# Patient Record
Sex: FEMALE | Race: WHITE | NOT HISPANIC OR LATINO | URBAN - METROPOLITAN AREA
[De-identification: names, ages, dates, MRNs, and addresses within clinical notes are randomized per-mention and may not be internally consistent; named-entity substitution may affect disease eponyms.]

---

## 2017-08-30 ENCOUNTER — IMPORTED ENCOUNTER (OUTPATIENT)
Dept: URBAN - METROPOLITAN AREA CLINIC 50 | Facility: CLINIC | Age: 72
End: 2017-08-30

## 2017-10-04 ENCOUNTER — IMPORTED ENCOUNTER (OUTPATIENT)
Dept: URBAN - METROPOLITAN AREA CLINIC 50 | Facility: CLINIC | Age: 72
End: 2017-10-04

## 2018-04-04 ENCOUNTER — IMPORTED ENCOUNTER (OUTPATIENT)
Dept: URBAN - METROPOLITAN AREA CLINIC 50 | Facility: CLINIC | Age: 73
End: 2018-04-04

## 2018-06-15 ENCOUNTER — IMPORTED ENCOUNTER (OUTPATIENT)
Dept: URBAN - METROPOLITAN AREA CLINIC 50 | Facility: CLINIC | Age: 73
End: 2018-06-15

## 2018-06-18 ENCOUNTER — IMPORTED ENCOUNTER (OUTPATIENT)
Dept: URBAN - METROPOLITAN AREA CLINIC 50 | Facility: CLINIC | Age: 73
End: 2018-06-18

## 2018-06-27 ENCOUNTER — IMPORTED ENCOUNTER (OUTPATIENT)
Dept: URBAN - METROPOLITAN AREA CLINIC 50 | Facility: CLINIC | Age: 73
End: 2018-06-27

## 2018-07-06 ENCOUNTER — IMPORTED ENCOUNTER (OUTPATIENT)
Dept: URBAN - METROPOLITAN AREA CLINIC 50 | Facility: CLINIC | Age: 73
End: 2018-07-06

## 2018-07-13 ENCOUNTER — IMPORTED ENCOUNTER (OUTPATIENT)
Dept: URBAN - METROPOLITAN AREA CLINIC 50 | Facility: CLINIC | Age: 73
End: 2018-07-13

## 2018-10-03 ENCOUNTER — IMPORTED ENCOUNTER (OUTPATIENT)
Dept: URBAN - METROPOLITAN AREA CLINIC 50 | Facility: CLINIC | Age: 73
End: 2018-10-03

## 2018-10-08 ENCOUNTER — IMPORTED ENCOUNTER (OUTPATIENT)
Dept: URBAN - METROPOLITAN AREA CLINIC 50 | Facility: CLINIC | Age: 73
End: 2018-10-08

## 2019-04-03 ENCOUNTER — IMPORTED ENCOUNTER (OUTPATIENT)
Dept: URBAN - METROPOLITAN AREA CLINIC 50 | Facility: CLINIC | Age: 74
End: 2019-04-03

## 2019-04-05 ENCOUNTER — IMPORTED ENCOUNTER (OUTPATIENT)
Dept: URBAN - METROPOLITAN AREA CLINIC 50 | Facility: CLINIC | Age: 74
End: 2019-04-05

## 2019-06-14 ENCOUNTER — IMPORTED ENCOUNTER (OUTPATIENT)
Dept: URBAN - METROPOLITAN AREA CLINIC 50 | Facility: CLINIC | Age: 74
End: 2019-06-14

## 2019-06-17 ENCOUNTER — IMPORTED ENCOUNTER (OUTPATIENT)
Dept: URBAN - METROPOLITAN AREA CLINIC 50 | Facility: CLINIC | Age: 74
End: 2019-06-17

## 2019-08-01 ENCOUNTER — IMPORTED ENCOUNTER (OUTPATIENT)
Dept: URBAN - METROPOLITAN AREA CLINIC 50 | Facility: CLINIC | Age: 74
End: 2019-08-01

## 2019-08-02 ENCOUNTER — IMPORTED ENCOUNTER (OUTPATIENT)
Dept: URBAN - METROPOLITAN AREA CLINIC 50 | Facility: CLINIC | Age: 74
End: 2019-08-02

## 2019-08-07 ENCOUNTER — IMPORTED ENCOUNTER (OUTPATIENT)
Dept: URBAN - METROPOLITAN AREA CLINIC 50 | Facility: CLINIC | Age: 74
End: 2019-08-07

## 2019-08-07 PROBLEM — Z96.1: Noted: 2019-08-07

## 2019-08-07 PROBLEM — Z96.1: Noted: 2019-08-21

## 2019-08-12 ENCOUNTER — IMPORTED ENCOUNTER (OUTPATIENT)
Dept: URBAN - METROPOLITAN AREA CLINIC 50 | Facility: CLINIC | Age: 74
End: 2019-08-12

## 2019-08-16 ENCOUNTER — IMPORTED ENCOUNTER (OUTPATIENT)
Dept: URBAN - METROPOLITAN AREA CLINIC 50 | Facility: CLINIC | Age: 74
End: 2019-08-16

## 2019-08-21 ENCOUNTER — IMPORTED ENCOUNTER (OUTPATIENT)
Dept: URBAN - METROPOLITAN AREA CLINIC 50 | Facility: CLINIC | Age: 74
End: 2019-08-21

## 2019-08-26 ENCOUNTER — IMPORTED ENCOUNTER (OUTPATIENT)
Dept: URBAN - METROPOLITAN AREA CLINIC 50 | Facility: CLINIC | Age: 74
End: 2019-08-26

## 2019-09-20 ENCOUNTER — IMPORTED ENCOUNTER (OUTPATIENT)
Dept: URBAN - METROPOLITAN AREA CLINIC 50 | Facility: CLINIC | Age: 74
End: 2019-09-20

## 2020-01-08 ENCOUNTER — IMPORTED ENCOUNTER (OUTPATIENT)
Dept: URBAN - METROPOLITAN AREA CLINIC 50 | Facility: CLINIC | Age: 75
End: 2020-01-08

## 2020-06-29 ENCOUNTER — IMPORTED ENCOUNTER (OUTPATIENT)
Dept: URBAN - METROPOLITAN AREA CLINIC 50 | Facility: CLINIC | Age: 75
End: 2020-06-29

## 2020-07-10 ENCOUNTER — IMPORTED ENCOUNTER (OUTPATIENT)
Dept: URBAN - METROPOLITAN AREA CLINIC 50 | Facility: CLINIC | Age: 75
End: 2020-07-10

## 2021-01-04 ENCOUNTER — IMPORTED ENCOUNTER (OUTPATIENT)
Dept: URBAN - METROPOLITAN AREA CLINIC 50 | Facility: CLINIC | Age: 76
End: 2021-01-04

## 2021-04-18 ASSESSMENT — VISUAL ACUITY
OS_SC: 20/20
OS_BAT: 20/100
OS_CC: 20/50
OD_CC: 20/25-1
OS_OTHER: >20/400.
OD_CC: 20/30+2
OD_SC: 20/20
OS_PH: 20/40-1
OD_CC: J1+
OS_BAT: 20/20-2
OD_OTHER: 20/70. >20/400.
OD_CC: 20/40
OS_CC: 20/50
OD_SC: 20/30+
OS_CC: J1
OS_CC: J1+
OS_SC: 20/20-2
OD_BAT: 20/20-2
OD_CC: J1
OS_CC: 20/30-2
OD_CC: J1
OS_PH: 20/30+3
OD_OTHER: 20/70. >20/400.
OD_SC: 20/30-1
OD_CC: 20/20-
OD_PH: 20/20-2
OD_PH: @ 17 IN
OS_CC: 20/40-
OS_CC: J1+/-
OS_SC: 20/20
OS_OTHER: 20/200. 20/400.
OD_CC: 20/30-
OD_OTHER: 20/200. 20/400.
OS_BAT: 20/200
OS_PH: 20/30
OD_SC: 20/25 BLURRY
OD_CC: 20/30
OS_CC: J1+@ 18 IN
OS_CC: 20/60
OD_CC: J1+@ 18 IN
OD_SC: 20/40+1
OS_CC: 20/40+2
OD_CC: J1
OS_BAT: >20/400
OS_CC: J1
OD_BAT: 20/70
OS_OTHER: 20/100. >20/400.
OS_CC: J1
OD_CC: J1+/-
OD_SC: 20/30
OS_OTHER: 20/20-2. 20/20-2.
OS_PH: @ 17 IN
OD_BAT: 20/70
OS_CC: 20/70+2
OD_CC: 20/30-2
OD_BAT: 20/200
OD_BAT: 20/60
OS_SC: 20/20
OD_OTHER: 20/20-2. 20/25-2.
OS_CC: 20/50-
OD_CC: 20/30
OS_SC: 20/25+
OD_OTHER: 20/60. 20/200.

## 2021-04-18 ASSESSMENT — PACHYMETRY
OS_CT_UM: 543
OD_CT_UM: 550
OD_CT_UM: 550
OS_CT_UM: 543
OD_CT_UM: 550
OS_CT_UM: 543
OS_CT_UM: 543
OD_CT_UM: 550
OD_CT_UM: 550
OS_CT_UM: 543
OS_CT_UM: 543
OD_CT_UM: 550
OS_CT_UM: 543
OD_CT_UM: 550
OS_CT_UM: 543
OD_CT_UM: 550
OD_CT_UM: 550

## 2021-04-18 ASSESSMENT — TONOMETRY
OD_IOP_MMHG: 13
OS_IOP_MMHG: 18
OD_IOP_MMHG: 15
OS_IOP_MMHG: 12
OD_IOP_MMHG: 14
OD_IOP_MMHG: 16
OD_IOP_MMHG: 13
OD_IOP_MMHG: 14
OD_IOP_MMHG: 16
OS_IOP_MMHG: 14
OS_IOP_MMHG: 14
OS_IOP_MMHG: 15
OD_IOP_MMHG: 15
OD_IOP_MMHG: 12
OS_IOP_MMHG: 27
OD_IOP_MMHG: 14
OS_IOP_MMHG: 15
OD_IOP_MMHG: 14
OS_IOP_MMHG: 15
OS_IOP_MMHG: 12
OD_IOP_MMHG: 26
OS_IOP_MMHG: 13
OS_IOP_MMHG: 15
OS_IOP_MMHG: 13
OS_IOP_MMHG: 14
OD_IOP_MMHG: 15
OS_IOP_MMHG: 15

## 2021-06-28 ENCOUNTER — PREPPED CHART (OUTPATIENT)
Dept: URBAN - METROPOLITAN AREA CLINIC 53 | Facility: CLINIC | Age: 76
End: 2021-06-28

## 2021-06-29 ENCOUNTER — DIAGNOSTICS ONLY (OUTPATIENT)
Dept: URBAN - METROPOLITAN AREA CLINIC 53 | Facility: CLINIC | Age: 76
End: 2021-06-29

## 2021-06-29 DIAGNOSIS — H40.1131: ICD-10-CM

## 2021-06-29 PROCEDURE — 92083 EXTENDED VISUAL FIELD XM: CPT

## 2021-06-29 PROCEDURE — 92133 CPTRZD OPH DX IMG PST SGM ON: CPT

## 2021-07-12 ENCOUNTER — 6 MONTH FOLLOW-UP (OUTPATIENT)
Dept: URBAN - METROPOLITAN AREA CLINIC 53 | Facility: CLINIC | Age: 76
End: 2021-07-12

## 2021-07-12 DIAGNOSIS — H40.1131: ICD-10-CM

## 2021-07-12 PROCEDURE — 92012 INTRM OPH EXAM EST PATIENT: CPT

## 2021-07-12 ASSESSMENT — TONOMETRY
OS_IOP_MMHG: 13
OD_IOP_MMHG: 14

## 2021-07-12 ASSESSMENT — VISUAL ACUITY
OD_SC: 20/20
OS_SC: 20/20

## 2022-01-10 ENCOUNTER — COMPREHENSIVE EXAM (OUTPATIENT)
Dept: URBAN - METROPOLITAN AREA CLINIC 53 | Facility: CLINIC | Age: 77
End: 2022-01-10

## 2022-01-10 DIAGNOSIS — Z01.01: ICD-10-CM

## 2022-01-10 PROCEDURE — 92014 COMPRE OPH EXAM EST PT 1/>: CPT

## 2022-01-10 ASSESSMENT — VISUAL ACUITY
OD_SC: 20/25
OU_SC: J1+
OS_SC: 20/25

## 2022-01-10 ASSESSMENT — TONOMETRY
OD_IOP_MMHG: 17
OS_IOP_MMHG: 17

## 2024-07-27 ASSESSMENT — PATIENT GLOBAL ASSESSMENT (PGA): WHAT IS THE PGA: PATIENT GLOBAL ASSESSMENT:  1 - CLEAR

## 2024-07-27 ASSESSMENT — DERMATOLOGY QUALITY OF LIFE (QOL) ASSESSMENT
RATE HOW BOTHERED YOU ARE BY EFFECTS OF YOUR SKIN PROBLEMS ON YOUR ACTIVITIES (EG, GOING OUT, ACCOMPLISHING WHAT YOU WANT, WORK ACTIVITIES OR YOUR RELATIONSHIPS WITH OTHERS): 0 - NEVER BOTHERED
WHAT SINGLE SKIN CONDITION LISTED BELOW IS THE PATIENT ANSWERING THE QUALITY-OF-LIFE ASSESSMENT QUESTIONS ABOUT: NONE OF THE ABOVE
RATE HOW BOTHERED YOU ARE BY SYMPTOMS OF YOUR SKIN PROBLEM (EG, ITCHING, STINGING BURNING, HURTING OR SKIN IRRITATION): 0 - NEVER BOTHERED
RATE HOW EMOTIONALLY BOTHERED YOU ARE BY YOUR SKIN PROBLEM (FOR EXAMPLE, WORRY, EMBARRASSMENT, FRUSTRATION): 0 - NEVER BOTHERED
RATE HOW BOTHERED YOU ARE BY SYMPTOMS OF YOUR SKIN PROBLEM (EG, ITCHING, STINGING BURNING, HURTING OR SKIN IRRITATION): 0 - NEVER BOTHERED
RATE HOW BOTHERED YOU ARE BY EFFECTS OF YOUR SKIN PROBLEMS ON YOUR ACTIVITIES (EG, GOING OUT, ACCOMPLISHING WHAT YOU WANT, WORK ACTIVITIES OR YOUR RELATIONSHIPS WITH OTHERS): 0 - NEVER BOTHERED
RATE HOW EMOTIONALLY BOTHERED YOU ARE BY YOUR SKIN PROBLEM (FOR EXAMPLE, WORRY, EMBARRASSMENT, FRUSTRATION): 0 - NEVER BOTHERED
WHAT SINGLE SKIN CONDITION LISTED BELOW IS THE PATIENT ANSWERING THE QUALITY-OF-LIFE ASSESSMENT QUESTIONS ABOUT: NONE OF THE ABOVE

## 2024-08-01 ENCOUNTER — APPOINTMENT (OUTPATIENT)
Dept: DERMATOLOGY | Facility: CLINIC | Age: 79
End: 2024-08-01
Payer: MEDICARE

## 2024-08-01 DIAGNOSIS — L82.1 SEBORRHEIC KERATOSIS: ICD-10-CM

## 2024-08-01 DIAGNOSIS — D18.01 HEMANGIOMA OF SKIN: ICD-10-CM

## 2024-08-01 DIAGNOSIS — L81.4 LENTIGO: ICD-10-CM

## 2024-08-01 DIAGNOSIS — Z12.83 ENCOUNTER FOR SCREENING FOR MALIGNANT NEOPLASM OF SKIN: ICD-10-CM

## 2024-08-01 DIAGNOSIS — D22.9 MELANOCYTIC NEVUS, UNSPECIFIED LOCATION: Primary | ICD-10-CM

## 2024-08-01 PROCEDURE — 99203 OFFICE O/P NEW LOW 30 MIN: CPT | Performed by: DERMATOLOGY

## 2024-08-01 PROCEDURE — 1159F MED LIST DOCD IN RCRD: CPT | Performed by: DERMATOLOGY

## 2024-08-01 RX ORDER — BLOOD-GLUCOSE METER
EACH MISCELLANEOUS
COMMUNITY
Start: 2024-07-30

## 2024-08-01 RX ORDER — ANASTROZOLE 1 MG/1
TABLET ORAL
COMMUNITY
Start: 2023-11-17

## 2024-08-01 RX ORDER — ROSUVASTATIN CALCIUM 10 MG/1
TABLET, COATED ORAL
COMMUNITY
Start: 2024-07-03

## 2024-08-01 RX ORDER — PROCHLORPERAZINE MALEATE 10 MG
10 TABLET ORAL EVERY 6 HOURS PRN
COMMUNITY
Start: 2023-11-17

## 2024-08-01 RX ORDER — AMOXICILLIN 500 MG/1
TABLET, FILM COATED ORAL
COMMUNITY
Start: 2024-05-01

## 2024-08-01 RX ORDER — POTASSIUM CITRATE AND CITRIC ACID MONOHYDRATE 1100; 334 MG/5ML; MG/5ML
SOLUTION ORAL
COMMUNITY

## 2024-08-01 RX ORDER — LEVOTHYROXINE SODIUM 125 UG/1
1 TABLET ORAL DAILY
COMMUNITY

## 2024-08-01 RX ORDER — PHENYLEPHRINE HCL 10 MG
TABLET ORAL
COMMUNITY

## 2024-08-01 RX ORDER — LANCETS 33 GAUGE
EACH MISCELLANEOUS
COMMUNITY
Start: 2024-05-02

## 2024-08-01 RX ORDER — LISINOPRIL 2.5 MG/1
TABLET ORAL
COMMUNITY
Start: 2024-03-28

## 2024-08-01 RX ORDER — ALLOPURINOL 300 MG/1
TABLET ORAL
COMMUNITY
Start: 2023-08-21

## 2024-08-01 ASSESSMENT — ENCOUNTER SYMPTOMS
DEPRESSION: 0
LOSS OF SENSATION IN FEET: 0
OCCASIONAL FEELINGS OF UNSTEADINESS: 0

## 2024-08-01 NOTE — PROGRESS NOTES
Subjective     Yanira Mosher is a 79 y.o. female who presents for the following: Skin Check (New).     Skin Cancer Screening  She has a history of moderate sun exposure. She is in the sun occasionally. She uses sunscreen never. She reports no skin symptoms. Her moles are not changing.    Spots that concern her: none    Family History of Skin Cancer(s):  Mother - Melanoma  Personal History of Skin Cancer(s):  unsure, had Mohs out of stte  Family History of Skin Disorder(s):  none  Personal History of Skin Disorder(s):  psoriasis (feet)        Review of Systems:  No other skin or systemic complaints other than what is documented elsewhere in the note.    The following portions of the chart were reviewed this encounter and updated as appropriate:       Skin Cancer History  No skin cancer on file.    Specialty Problems    None    Past Medical History:  Yanira Mosher  has no past medical history on file.    Past Surgical History:  Yanira Mosher  has no past surgical history on file.    Family History:  Patient family history is not on file.       Objective   Well appearing patient in no apparent distress; mood and affect are within normal limits.    A full examination was performed including scalp, head, eyes, ears, nose, lips, neck, chest, axillae, abdomen, back, buttocks, bilateral upper extremities, bilateral lower extremities, hands, feet, fingers, toes, fingernails, and toenails. All findings within normal limits unless otherwise noted below.    Assessment/Plan   1. Hemangioma of skin  Scattered cherry-red papule(s).    2. Melanocytic nevus, unspecified location  All nevi were symmetric brown macules without atypia on dermoscopy.     The ABCDEs of melanoma and warning signs of non-melanoma skin cancer were discussed with patient and patient expressed understanding. Sun protection and use of at least SPF 30 discussed with patient. Pt instructed to reapply every 2 hours.     3. Lentigo  Scattered tan macules in  sun-exposed areas.    The ABCDEs of melanoma and warning signs of non-melanoma skin cancer were discussed with patient and patient expressed understanding. Sun protection and use of at least SPF 30 discussed with patient. Pt instructed to reapply every 2 hours.     4. Seborrheic keratosis  Stuck on verrucous, tan-brown papules and plaques.         Follow up 1-3 years or sooner as needed.

## 2025-08-06 ENCOUNTER — APPOINTMENT (OUTPATIENT)
Dept: DERMATOLOGY | Facility: CLINIC | Age: 80
End: 2025-08-06
Payer: MEDICARE

## 2025-09-25 ENCOUNTER — APPOINTMENT (OUTPATIENT)
Dept: DERMATOLOGY | Facility: CLINIC | Age: 80
End: 2025-09-25
Payer: MEDICARE